# Patient Record
Sex: MALE | Race: WHITE
[De-identification: names, ages, dates, MRNs, and addresses within clinical notes are randomized per-mention and may not be internally consistent; named-entity substitution may affect disease eponyms.]

---

## 2017-11-10 ENCOUNTER — HOSPITAL ENCOUNTER (EMERGENCY)
Dept: HOSPITAL 17 - NEPD | Age: 22
LOS: 1 days | Discharge: HOME | End: 2017-11-11
Payer: COMMERCIAL

## 2017-11-10 VITALS
RESPIRATION RATE: 18 BRPM | SYSTOLIC BLOOD PRESSURE: 133 MMHG | HEART RATE: 98 BPM | OXYGEN SATURATION: 98 % | TEMPERATURE: 98.6 F | DIASTOLIC BLOOD PRESSURE: 76 MMHG

## 2017-11-10 DIAGNOSIS — F91.1: Primary | ICD-10-CM

## 2017-11-10 DIAGNOSIS — F84.0: ICD-10-CM

## 2017-11-10 DIAGNOSIS — Q87.1: ICD-10-CM

## 2017-11-10 DIAGNOSIS — F42.9: ICD-10-CM

## 2017-11-10 LAB
ALP SERPL-CCNC: 93 U/L (ref 45–117)
ALT SERPL-CCNC: 82 U/L (ref 9–52)
ANION GAP SERPL CALC-SCNC: 8 MEQ/L (ref 5–15)
AST SERPL-CCNC: 43 U/L (ref 15–39)
BASOPHILS # BLD AUTO: 0 TH/MM3 (ref 0–0.2)
BASOPHILS NFR BLD: 0.3 % (ref 0–2)
BILIRUB SERPL-MCNC: 0.2 MG/DL (ref 0.2–1)
BUN SERPL-MCNC: 10 MG/DL (ref 7–18)
CHLORIDE SERPL-SCNC: 103 MEQ/L (ref 98–107)
EOSINOPHIL # BLD: 0.3 TH/MM3 (ref 0–0.4)
EOSINOPHIL NFR BLD: 1.8 % (ref 0–4)
ERYTHROCYTE [DISTWIDTH] IN BLOOD BY AUTOMATED COUNT: 13.9 % (ref 11.6–17.2)
GFR SERPLBLD BASED ON 1.73 SQ M-ARVRAT: 137 ML/MIN (ref 89–?)
HCO3 BLD-SCNC: 26 MEQ/L (ref 21–32)
HCT VFR BLD CALC: 39 % (ref 39–51)
HEMO FLAGS: (no result)
LYMPHOCYTES # BLD AUTO: 2.6 TH/MM3 (ref 1–4.8)
LYMPHOCYTES NFR BLD AUTO: 17.8 % (ref 9–44)
MCH RBC QN AUTO: 28 PG (ref 27–34)
MCHC RBC AUTO-ENTMCNC: 33.8 % (ref 32–36)
MCV RBC AUTO: 82.9 FL (ref 80–100)
MONOCYTES NFR BLD: 7.8 % (ref 0–8)
NEUTROPHILS # BLD AUTO: 10.6 TH/MM3 (ref 1.8–7.7)
NEUTROPHILS NFR BLD AUTO: 72.3 % (ref 16–70)
PLATELET # BLD: 337 TH/MM3 (ref 150–450)
POTASSIUM SERPL-SCNC: 3.8 MEQ/L (ref 3.5–5.1)
RBC # BLD AUTO: 4.71 MIL/MM3 (ref 4.5–5.9)
SODIUM SERPL-SCNC: 137 MEQ/L (ref 136–145)
WBC # BLD AUTO: 14.7 TH/MM3 (ref 4–11)

## 2017-11-10 PROCEDURE — 99284 EMERGENCY DEPT VISIT MOD MDM: CPT

## 2017-11-10 PROCEDURE — 80053 COMPREHEN METABOLIC PANEL: CPT

## 2017-11-10 PROCEDURE — 70450 CT HEAD/BRAIN W/O DYE: CPT

## 2017-11-10 PROCEDURE — 85025 COMPLETE CBC W/AUTO DIFF WBC: CPT

## 2017-11-10 NOTE — RADRPT
EXAM DATE/TIME:  11/10/2017 21:43 

 

[facility] COMPARISON:     

No previous studies available for comparison.

 

 

INDICATIONS :     

Altered mental status.

                      

 

RADIATION DOSE:     

32.87 CTDIvol (mGy) 

 

 

 

MEDICAL HISTORY :     

None  

 

SURGICAL HISTORY :      

None. 

 

ENCOUNTER:      

Initial

 

ACUITY:      

1 day

 

PAIN SCALE:      

0/10

 

LOCATION:         

 

TECHNIQUE:     

Multiple contiguous axial images were obtained of the head.  Using automated exposure control and adj
ustment of the mA and/or kV according to patient size, radiation dose was kept as low as reasonably a
chievable to obtain optimal diagnostic quality images.   DICOM format image data is available electro
nically for review and comparison.  

 

FINDINGS:     

 

CEREBRUM:     

The ventricles are normal for age.  No evidence of midline shift, mass lesion, hemorrhage or acute in
farction.  No extra-axial fluid collections are seen.

 

POSTERIOR FOSSA:     

The cerebellum and brainstem are intact.  The 4th ventricle is midline.  The cerebellopontine angle i
s unremarkable.

 

EXTRACRANIAL:     

The visualized portion of the orbits is intact.

 

SKULL:     

The calvaria is intact.  No evidence of skull fracture.

 

CONCLUSION:     

Normal examination.  

 

 

 

 Suraj Morelos Jr., MD on November 10, 2017 at 21:54           

Board Certified Radiologist.

 This report was verified electronically.
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
## 2017-11-11 VITALS
HEART RATE: 76 BPM | OXYGEN SATURATION: 99 % | TEMPERATURE: 97.8 F | SYSTOLIC BLOOD PRESSURE: 132 MMHG | RESPIRATION RATE: 16 BRPM | DIASTOLIC BLOOD PRESSURE: 67 MMHG

## 2017-11-11 VITALS — TEMPERATURE: 97.8 F | SYSTOLIC BLOOD PRESSURE: 128 MMHG | DIASTOLIC BLOOD PRESSURE: 71 MMHG
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
## 2017-11-11 NOTE — PD
Physical Exam


Date Seen by Provider:  Nov 11, 2017


Time Seen by Provider:  08:26


Narrative


22-year-old male patient presents to our facility under protective custody.  

Patient was medically cleared by previous provider.  Patient is currently 

psychiatrically cleared and Portillo act is lifted.  I was asked to disposition 

the patient.





Data


Data


Last Documented VS





Vital Signs








  Date Time  Temp Pulse Resp B/P (MAP) Pulse Ox O2 Delivery O2 Flow Rate FiO2


 


11/10/17 20:46 98.6 98 18 133/76 (95) 98   








Orders





 Orders


Complete Blood Count With Diff (11/10/17 20:38)


Comprehensive Metabolic Panel (11/10/17 20:38)


Psych Screen (11/10/17 20:38)


Drug Screen, Random Urine (11/10/17 20:38)


Ct Brain W/O Iv Contrast(Rout) (11/10/17 )


Diet Regular Basic (11/11/17 Breakfast)





Labs





Laboratory Tests








Test


  11/10/17


20:45


 


White Blood Count 14.7 TH/MM3 


 


Red Blood Count 4.71 MIL/MM3 


 


Hemoglobin 13.2 GM/DL 


 


Hematocrit 39.0 % 


 


Mean Corpuscular Volume 82.9 FL 


 


Mean Corpuscular Hemoglobin 28.0 PG 


 


Mean Corpuscular Hemoglobin


Concent 33.8 % 


 


 


Red Cell Distribution Width 13.9 % 


 


Platelet Count 337 TH/MM3 


 


Mean Platelet Volume 7.5 FL 


 


Neutrophils (%) (Auto) 72.3 % 


 


Lymphocytes (%) (Auto) 17.8 % 


 


Monocytes (%) (Auto) 7.8 % 


 


Eosinophils (%) (Auto) 1.8 % 


 


Basophils (%) (Auto) 0.3 % 


 


Neutrophils # (Auto) 10.6 TH/MM3 


 


Lymphocytes # (Auto) 2.6 TH/MM3 


 


Monocytes # (Auto) 1.2 TH/MM3 


 


Eosinophils # (Auto) 0.3 TH/MM3 


 


Basophils # (Auto) 0.0 TH/MM3 


 


CBC Comment DIFF FINAL 


 


Differential Comment  


 


Blood Urea Nitrogen 10 MG/DL 


 


Creatinine 0.73 MG/DL 


 


Random Glucose 100 MG/DL 


 


Total Protein 8.2 GM/DL 


 


Albumin 3.9 GM/DL 


 


Calcium Level 9.1 MG/DL 


 


Alkaline Phosphatase 93 U/L 


 


Aspartate Amino Transf


(AST/SGOT) 43 U/L 


 


 


Alanine Aminotransferase


(ALT/SGPT) 82 U/L 


 


 


Total Bilirubin 0.2 MG/DL 


 


Sodium Level 137 MEQ/L 


 


Potassium Level 3.8 MEQ/L 


 


Chloride Level 103 MEQ/L 


 


Carbon Dioxide Level 26.0 MEQ/L 


 


Anion Gap 8 MEQ/L 


 


Estimat Glomerular Filtration


Rate 137 ML/MIN 


 











MDM


Supervised Visit with PRAVEEN:  Yes


Differential Diagnosis


Depression versus suicidal ideation versus anxiety versus adjustment disorder 

versus mood disorder versus bipolar disorder versus schizophrenia versus 

paranoid disorder versus psychosis versus substance abuse versus alcohol abuse 

versus alcohol induced psychosis versus homicidality addition versus cutting 

versus personality disorder


Narrative Course


I was asked to disposition this patient after the Portillo act was lifted.  

Patient has been cleared and psychiatrically cleared from our facility.  This 

is a well-nourished 22-year-old male patient currently in no acute distress.  

Patient has a psychiatric history and occasional anger outbursts however he is 

calm, cooperative and ready to go home at this time.  Patient cleared for 

discharge at this time.  Patient will be discharged home with mother.


Diagnosis





 Primary Impression:  


 Aggressive behavior


 Additional Impression:  


 Prader-Willi syndrome


Referrals:  


[facility] (Out patient)


Patient Instructions:  Brief Psychotic Disorder (ED), General Instructions, 

Medical Clearance for Psychiatric Care (ED)





***Additional Instruction:  


Please return to emergency department if your symptoms return or worsen. 


Follow up with your primary care provider. 


May follow-up with [facility] and/or [facility]


Scripts


No Active Prescriptions or Reported Meds


Disposition:  01 DISCHARGE HOME


Condition:  Stable











Deneen Jon Nov 11, 2017 08:30

## 2017-12-01 ENCOUNTER — HOSPITAL ENCOUNTER (EMERGENCY)
Dept: HOSPITAL 17 - NEPD | Age: 22
LOS: 1 days | Discharge: HOME | End: 2017-12-02
Payer: COMMERCIAL

## 2017-12-01 VITALS — WEIGHT: 154.32 LBS | HEIGHT: 64 IN | BODY MASS INDEX: 26.35 KG/M2

## 2017-12-01 VITALS
DIASTOLIC BLOOD PRESSURE: 83 MMHG | SYSTOLIC BLOOD PRESSURE: 127 MMHG | OXYGEN SATURATION: 100 % | TEMPERATURE: 98.1 F | RESPIRATION RATE: 18 BRPM | HEART RATE: 93 BPM

## 2017-12-01 DIAGNOSIS — Q87.1: ICD-10-CM

## 2017-12-01 DIAGNOSIS — F84.0: Primary | ICD-10-CM

## 2017-12-01 DIAGNOSIS — F42.9: ICD-10-CM

## 2017-12-01 PROCEDURE — 99283 EMERGENCY DEPT VISIT LOW MDM: CPT
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
## 2017-12-27 ENCOUNTER — HOSPITAL ENCOUNTER (EMERGENCY)
Dept: HOSPITAL 17 - NEPD | Age: 22
LOS: 1 days | Discharge: HOME | End: 2017-12-28
Payer: COMMERCIAL

## 2017-12-27 VITALS
HEART RATE: 89 BPM | OXYGEN SATURATION: 99 % | RESPIRATION RATE: 18 BRPM | SYSTOLIC BLOOD PRESSURE: 137 MMHG | DIASTOLIC BLOOD PRESSURE: 82 MMHG | TEMPERATURE: 98.7 F

## 2017-12-27 VITALS — WEIGHT: 198.42 LBS | BODY MASS INDEX: 33.06 KG/M2 | HEIGHT: 65 IN
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
## 2017-12-27 DIAGNOSIS — S81.811A: ICD-10-CM

## 2017-12-27 DIAGNOSIS — F84.0: Primary | ICD-10-CM
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
## 2017-12-27 DIAGNOSIS — Z23: ICD-10-CM

## 2017-12-27 PROCEDURE — 90714 TD VACC NO PRESV 7 YRS+ IM: CPT

## 2017-12-27 PROCEDURE — 12002 RPR S/N/AX/GEN/TRNK2.6-7.5CM: CPT

## 2017-12-27 PROCEDURE — 73590 X-RAY EXAM OF LOWER LEG: CPT

## 2017-12-27 PROCEDURE — 90471 IMMUNIZATION ADMIN: CPT
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
## 2017-12-27 NOTE — RADRPT
EXAM DATE/TIME:  12/27/2017 19:27 

 

[facility] COMPARISON:     

No previous studies available for comparison.

 

                     

INDICATIONS :     

Right tibia laceration.

                     

 

MEDICAL HISTORY :     

None.          

 

SURGICAL HISTORY :     

None.   

 

ENCOUNTER:     

Initial                                        

 

ACUITY:     

1 day      

 

PAIN SCORE:     

Non-responsive.

 

LOCATION:     

Right distal tibia.

 

FINDINGS:     

Two view examination of the right tibia demonstrates no evidence of fracture or dislocation. There is
 soft tissue prominence and disruption in the anterior distal calf region. Bony mineralization is nor
mal. No radiopaque foreign bodies.

 

CONCLUSION:     

1. Soft tissue prominence and disruption in the anterior distal calf region without radiopaque foreig
n body or acute fracture.

 

 

 

 Jaime Hannon MD on December 27, 2017 at 19:37           

Board Certified Radiologist.

 This report was verified electronically.

## 2018-01-10 ENCOUNTER — HOSPITAL ENCOUNTER (EMERGENCY)
Dept: HOSPITAL 17 - NED | Age: 23
Discharge: HOME | End: 2018-01-10
Payer: SELF-PAY

## 2018-01-10 VITALS — DIASTOLIC BLOOD PRESSURE: 89 MMHG | SYSTOLIC BLOOD PRESSURE: 140 MMHG | TEMPERATURE: 98 F | OXYGEN SATURATION: 96 %

## 2018-01-10 DIAGNOSIS — Z48.02: Primary | ICD-10-CM

## 2018-01-10 PROCEDURE — 99281 EMR DPT VST MAYX REQ PHY/QHP: CPT

## 2018-01-10 NOTE — PD
HPI


Chief Complaint:  Wound/Suture/Staple Re-Check


Time Seen by Provider:  19:54


Travel History


International Travel<30 days:  No


Contact w/Intl Traveler<30days:  No


Traveled to known affect area:  No





History of Present Illness


HPI


22-year-old white male presents to emergency department accompanied by his 

mother for evaluation of a laceration which was closed staples 14 days ago.  He 

is here for staple removal.  He denies any fever chills.  No drainage.  Mild 

tenderness around the staples.





PFSH


Past Medical History


Diminished Hearing:  No


Psychiatric:  Yes (OCD R/T PWS)


Tetanus Vaccination:  < 5 Years





Social History


Alcohol Use:  No


Tobacco Use:  No


Substance Use:  No





Allergies-Medications


(Allergen,Severity, Reaction):  


Coded Allergies:  


     No Known Allergies (Verified  Allergy, Unknown, 12/27/17)


Reported Meds & Prescriptions





Reported Meds & Active Scripts


Active


No Active Prescriptions or Reported Medications    








Review of Systems


General / Constitutional:  No: Fever


Eyes:  No: Visual changes


HENT:  No: Headaches


Cardiovascular:  No: Chest Pain or Discomfort


Respiratory:  No: Shortness of Breath


Gastrointestinal:  No: Abdominal Pain


Genitourinary:  No: Dysuria


Musculoskeletal:  No: Pain


Skin:  No Rash


Neurologic:  No: Weakness


Psychiatric:  No: Depression


Endocrine:  No: Polydipsia


Hematologic/Lymphatic:  No: Easy Bruising





Physical Exam


Narrative


GENERAL: This is a well-nourished, well-developed patient, in no apparent 

distress.


SKIN: No rashes, ecchymoses or lesions. Warm and dry.


HEAD: Atraumatic. Normocephalic.


EYES: PERRL, EOMI, no discharge or injection. No scleral icterus.


EARS: Clear


NOSE: Nasal turbinates appear normal.


THROAT: Mucosa pink and moist.  Airway patent.


NECK: Trachea midline. supple, moves head freely.


LUNGS: Clear to auscultation.


CV: Regular in rhythm.


ABDOMEN: Soft nontender.


EXT: No clubbing cyanosis or edema.  Patient has a well-healed laceration to 

the right anterior pretibial region.  No signs of infection.  There is mild 

erythema around the staples which I suspect is more rate suture reaction from 

being in 14 days.





Data


Data


Last Documented VS





Vital Signs








  Date Time  Temp Pulse Resp B/P (MAP) Pulse Ox O2 Delivery O2 Flow Rate FiO2


 


1/10/18 20:07        


 


1/10/18 17:00 98.0 111 28  96 Room Air  








Orders





 Orders


Ed Discharge Order (1/10/18 19:58)








MDM


Medical Decision Making


Medical Screen Exam Complete:  Yes


Emergency Medical Condition:  Yes


Medical Record Reviewed:  Yes


Differential Diagnosis


MDM: High


Differential diagnoses: Fracture, sprain, strain, dislocation, contusion, 

neurovascular injury


Narrative Course


Patient Staples are removed without incidence dressing applied.





This is an counter for staple removal





Diagnosis





 Primary Impression:  


 Encounter for staple removal


Patient Instructions:  General Instructions





***Additional Instructions:  


Rest.


Elevation.


Keep clean and dry.


Follow-up with your doctor as needed.


***Med/Other Pt SpecificInfo:  Wound Care


Scripts


No Active Prescriptions or Reported Meds


Disposition:  01 DISCHARGE HOME


Condition:  Stable











Adrian Garner Enrico 10, 2018 19:58

## 2018-03-19 ENCOUNTER — HOSPITAL ENCOUNTER (EMERGENCY)
Dept: HOSPITAL 17 - NEPJ | Age: 23
LOS: 1 days | Discharge: HOME | End: 2018-03-20
Payer: COMMERCIAL

## 2018-03-19 VITALS
RESPIRATION RATE: 20 BRPM | DIASTOLIC BLOOD PRESSURE: 76 MMHG | OXYGEN SATURATION: 97 % | SYSTOLIC BLOOD PRESSURE: 164 MMHG | HEART RATE: 107 BPM

## 2018-03-19 DIAGNOSIS — Q87.1: Primary | ICD-10-CM

## 2018-03-19 DIAGNOSIS — F42.8: ICD-10-CM

## 2018-03-19 DIAGNOSIS — F84.0: ICD-10-CM

## 2018-03-19 PROCEDURE — 99284 EMERGENCY DEPT VISIT MOD MDM: CPT

## 2018-03-19 NOTE — PD
HPI


Chief Complaint:  Psychiatric Symptoms


Time Seen by Provider:  18:28


Travel History


International Travel<30 days:  No


Contact w/Intl Traveler<30days:  No





History of Present Illness


HPI


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 

with increased aggressive behavior towards his mother, with reports of possible 

suicidal ideation.  Patient is a very poor historian due to his mental 

condition.  He has no complaints.  He has no obvious acute injuries.  He has no 

known drug allergies per





[facility]


Past Medical History


Medical History:  Unable to Obtain


Diminished Hearing:  No


Psychiatric:  Yes (OCD R/T PWS)





Social History


Alcohol Use:  No


Tobacco Use:  No


Substance Use:  No





Allergies-Medications


(Allergen,Severity, Reaction):  


Coded Allergies:  


     No Known Allergies (Verified  Allergy, Unknown, 12/27/17)


Reported Meds & Prescriptions





Reported Meds & Active Scripts


Active


No Active Prescriptions or Reported Medications    








Review of Systems


ROS Limitations:  Clinical Condition, Poor Historian


Except as stated in HPI:  all other systems reviewed are Neg


General / Constitutional:  No: Fever


Eyes:  No: Visual changes


HENT:  No: Headaches


Cardiovascular:  No: Chest Pain or Discomfort


Respiratory:  No: Shortness of Breath


Gastrointestinal:  No: Abdominal Pain


Genitourinary:  No: Dysuria


Musculoskeletal:  No: Pain


Skin:  No Rash


Neurologic:  No: Weakness


Psychiatric:  No: Depression


Endocrine:  No: Polydipsia


Hematologic/Lymphatic:  No: Easy Bruising





Physical Exam


Exam Limitations:  Clinical Condition, Poor Historian


Narrative


GENERAL: Patient appears in no obvious distress per


SKIN: Warm and dry.  Normal color.  Normal turgor.  Patient has superficial cut 

to the right anterior knee covered with bandage.  Patient also has superficial 

cut to the right ring finger cover with bandage.


HEAD: Atraumatic. Normocephalic. 


EYES: Pupils equal and round. No scleral icterus. No injection or drainage. 


ENT: No nasal bleeding or discharge.  Mucous membranes pink and moist.  Pharynx 

is clear.  Airways patent


NECK: Trachea midline.  Supple nontender


CARDIOVASCULAR: Regular rate and rhythm.  


RESPIRATORY: No accessory muscle use. Clear to auscultation. Breath sounds 

equal bilaterally. 


MUSCULOSKELETAL: Extremities without clubbing, cyanosis, or edema. No obvious 

deformities. 


NEUROLOGICAL: Awake and alert. No obvious cranial nerve deficits.  Motor 

grossly within normal limits. Five out of 5 muscle strength in the arms and 

legs.  Patient does speak with during my exam does not speak to me when 

questioned.


PSYCHIATRIC: Unable to assess per





Data


Data


Orders





 Orders


Diet Regular Basic (3/19/18 Dinner)


Complete Blood Count With Diff (3/19/18 18:30)


Comprehensive Metabolic Panel (3/19/18 18:30)


Thyroid Stimulating Hormone (3/19/18 18:30)


Psych Screen (3/19/18 18:30)


Urinalysis - C+S If Indicated (3/19/18 18:37)








MDM


Medical Decision Making


Medical Screen Exam Complete:  Yes


Emergency Medical Condition:  Yes


Differential Diagnosis


Baker act.  Aggressive behavior.  Suicidal ideation


Narrative Course


Psychiatric labs were ordered per protocol.


Patient is medically cleared for psychiatric evaluation.


Psych screen is ordered.





Diagnosis





 Primary Impression:  


 Prader-Willi syndrome


Scripts


No Active Prescriptions or Reported Meds


Condition:  Stable











Ray Yu Mar 19, 2018 18:39

## 2018-03-20 VITALS — DIASTOLIC BLOOD PRESSURE: 82 MMHG | RESPIRATION RATE: 22 BRPM | HEART RATE: 120 BPM | SYSTOLIC BLOOD PRESSURE: 160 MMHG

## 2018-03-20 VITALS — RESPIRATION RATE: 18 BRPM

## 2018-03-20 NOTE — PD
Physical Exam


Date Seen by Provider:  Mar 20, 2018


Time Seen by Provider:  11:01


Narrative


22-year-old patient with autistic disorder, previously medically cleared for 

psychiatric evaluation, has been seen by Dr. Thomas and felt to be 

psychiatrically stable for discharge.  Patient's mother has been called and 

spoken to by the psychiatrist.  Follow-up will be based on psychiatric note.  

Patient remains medically stable at this time.





Data


Data


Last Documented VS





Vital Signs








  Date Time  Temp Pulse Resp B/P (MAP) Pulse Ox O2 Delivery O2 Flow Rate FiO2


 


3/20/18 02:32   18     


 


3/19/18 18:57  107   97 Room Air  








Orders





 Orders


Diet Regular Basic (3/19/18 Dinner)


Complete Blood Count With Diff (3/19/18 18:30)


Comprehensive Metabolic Panel (3/19/18 18:30)


Thyroid Stimulating Hormone (3/19/18 18:30)


Psych Screen (3/19/18 18:30)


Urinalysis - C+S If Indicated (3/19/18 18:37)


Diphenhydramine Inj (Benadryl Inj) (3/19/18 22:27)


Olanzapine Inj (Zyprexa Inj) (3/19/18 22:27)


Olanzapine Inj (Zyprexa Inj) (3/19/18 22:20)


Diphenhydramine Inj (Benadryl Inj) (3/19/18 22:20)


Diet Regular Basic (3/20/18 Breakfast)








MDM


Medical Record Reviewed:  Yes


Supervised Visit with PRAVEEN:  Yes


Narrative Course


22-year-old patient with autistic disorder, previously medically cleared for 

psychiatric evaluation, has been seen by Dr. Thomas and felt to be 

psychiatrically stable for discharge.  Patient's mother has been called and 

spoken to by the psychiatrist.  Follow-up will be based on psychiatric note.  

Patient remains medically stable at this time.


Diagnosis





 Primary Impression:  


 Prader-Willi syndrome


Patient Instructions:  General Instructions


Scripts


No Active Prescriptions or Reported Meds


Disposition:  01 DISCHARGE HOME


Condition:  Stable











Ray Yu Mar 20, 2018 11:03

## 2018-03-20 NOTE — PD.PSY.CON
Provisional Diagnosis


Admission Date





Axis I.


Autism spectrum disorder,


Axis II.


Deferred


Axis III.


Prader-Willi's syndrome





History of Present Illness


Service


Psychiatry


Consult Requested By


ER team


Reason for Consult


Under Baker act


Primary Care Physician


Unknown


HPI


The patient is 22-year-old  man, domiciled with his mother in the 

[place], unemployed, single, with psychiatric history of autism spectrum 

disorder, developmental delay, moderate to severe intellectual disability, due 

to Prader Hess syndromes, no previous psychiatric hospitalizations, patient 

is not engaged in outpatient psychiatric care, no medications, with medical 

history of Prader-Willi syndrome, who was brought in under the Baker act with 

increased aggressive behavior towards his mother, with reports of possible 

suicidal ideation.  On psychiatric evaluation the patient is found in the cevallos 

of the ER, he is running, playing, making weird noises, very childish like.  He 

is not agitated, he is not aggressive, persistently smiling.  Patient is a very 

poor historian due to his mental condition/intellectual disability.  Patient 

does not have any psychiatric complaint.  He does not answer to any of my 

questions.  There is no voice of suicidal ideation or homicidal ideation, no 

visual or auditory hallucinations.  I spoke with his mother Carley Connor, [phone], who explains "a couple of things in the kitchen".  She became scared, 

the patient is not usually aggressive, and she preferred to send him to the ER.

  She reports that the patient was in Abilify in the past for poor impulse 

control and aggressive behavior, but he has not been taking the medication for 

a long time now.  I let her know that the patient has been very calm, 

cooperative, childlike in the unit, and she admits that this is his baseline.  

I also explained her that explosive behavior, poor impulse control is part of 

the nature of intellectual disability and can be expected from time to time.  

She verbalized agreement with this idea, as well that she verbalized agreement 

with find an outpatient psychiatric or as soon as possible.





Review of Systems


Constitutional:  DENIES: Diaphoretic episodes, Fatigue, Fever, Weight gain, 

Weight loss, Chills, Dizziness, Change in appetite, Night Sweats


Endocrine:  DENIES: Heat/cold intolerance, Polydipsia, Polyuria, Polyphagia


Eyes:  DENIES: Blurred vision, Diplopia, Eye inflammation, Eye pain, Vision loss

, Photosensitivity, Double Vision


Ears, nose, mouth, throat:  DENIES: Tinnitus, Hearing loss, Vertigo, Nasal 

discharge, Oral lesions, Throat pain, Hoarseness, Ear Pain, Running Nose, 

Epistaxis, Sinus Pain, Toothache, Odynophagia


Respiratory:  DENIES: Apneas, Cough, Snoring, Wheezing, Hemoptysis, Sputum 

production, Shortness of breath


Cardiovascular:  DENIES: Chest pain, Palpitations, Syncope, Dyspnea on Exertion

, PND, Lower Extremity Edema, Orthopnea, Claudication


Gastrointestinal:  DENIES: Abdominal pain, Black stools, Bloody stools, 

Constipation, Diarrhea, Nausea, Vomiting, Difficulty Swallowing, Anorexia


Genitourinary:  DENIES: Sexual dysfunction, Urinary frequency, Urinary 

incontinence, Urgency, Hematuria, Dysuria, Nocturia, Penile Discharge, 

Testicular Pain, Testicular Swelling


Musculoskeletal:  DENIES: Joint pain, Muscle aches, Stiffness, Joint Swelling, 

Back pain, Neck pain


Integumentary:  DENIES: Abnormal pigmentation, Nail changes, Pruritus, Rash


Hematologic/lymphatic:  DENIES: Bruising, Lymphadenopathy


Immunologic/allergic:  DENIES: Eczema, Urticaria


Neurologic:  DENIES: Abnormal gait, Headache, Localized weakness, Paresthesias, 

Seizures, Speech Problems, Tremor, Poor Balance


Psychiatric:  DENIES: Anxiety, Confusion, Mood changes, Depression, 

Hallucinations, Agitation, Suicidal Ideation, Homicidal Ideation, Delusions





Past Family Social History


Coded Allergies:  


     No Known Allergies (Verified  Allergy, Unknown, 12/27/17)


No Active Prescriptions or Reported Meds





Physical Exam


Vital Signs





Vital Signs








  Date Time  Temp Pulse Resp B/P (MAP) Pulse Ox O2 Delivery O2 Flow Rate FiO2


 


3/20/18 11:17  120 22 160/82 (108)  Room Air  


 


3/19/18 18:57     97   














I/O   


 


 3/20/18 3/20/18 3/21/18





 08:00 16:00 00:00


 


Intake Total 360 ml 360 ml 


 


Balance 360 ml 360 ml 











Mental Status Examination


Appearance:  Appropriate


Consciousness:  Alert


Motor Activity:  Abnormal gait


Speech:  Other (Mute mute)


Mood:  Appropriate


Affect:  Appropriate


Insight:  Poor


Judgment:  Poor


Mental Status Exam Remarks


MSE is limited due to the level of intellectual disability





Assessment & Plan


Problem List:  


(1) Autism spectrum disorder


ICD Codes:  F84.0 - Autistic disorder


Assessment & Plan:  Patient does not meet criteria for involuntary psychiatric 

admission at this moment.  He does not present any neuropsychiatric symptoms 

that require an immediate psychiatric intervention.  Aggressive behavior poor 

impulse control, agitation is part of the nature of intellectual disability and 

autism, he has not endorsed any suicidality or homicidality.  On longitudinal 

observation the patient has been very childlike, is mildly very often, with 

prominent mannerisms, but no agitated or aggressive.  His mother was educated 

about the importance of engaging the patient in outpatient psychiatric care for 

management of poor impulse control.  Portillo act will be lifted.





Assessment & Plan


Estimated LOS:  days











Jayy Nguyễn MD Mar 20, 2018 14:21

## 2018-04-28 ENCOUNTER — HOSPITAL ENCOUNTER (EMERGENCY)
Dept: HOSPITAL 17 - NEPD | Age: 23
LOS: 1 days | Discharge: HOME | End: 2018-04-29
Payer: COMMERCIAL

## 2018-04-28 VITALS
HEART RATE: 111 BPM | DIASTOLIC BLOOD PRESSURE: 95 MMHG | TEMPERATURE: 98.5 F | OXYGEN SATURATION: 99 % | SYSTOLIC BLOOD PRESSURE: 143 MMHG | RESPIRATION RATE: 22 BRPM

## 2018-04-28 DIAGNOSIS — Q87.1: ICD-10-CM

## 2018-04-28 DIAGNOSIS — F84.0: Primary | ICD-10-CM

## 2018-04-28 PROCEDURE — 99284 EMERGENCY DEPT VISIT MOD MDM: CPT
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
## 2018-04-29 VITALS
RESPIRATION RATE: 17 BRPM | HEART RATE: 86 BPM | OXYGEN SATURATION: 96 % | SYSTOLIC BLOOD PRESSURE: 142 MMHG | DIASTOLIC BLOOD PRESSURE: 67 MMHG

## 2018-04-29 VITALS — DIASTOLIC BLOOD PRESSURE: 76 MMHG | SYSTOLIC BLOOD PRESSURE: 127 MMHG
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
## 2018-04-29 NOTE — PD
Physical Exam


Date Seen by Provider:  Apr 29, 2018


Time Seen by Provider:  10:37


Narrative


22-year-old male with history of Prader-Willi syndrome presents emergency 

department under [place] act for psychiatric evaluation.  Patient was medically 

cleared, and seen by psychiatric staff who cleared the patient of his [place] 

act.  Patient will be released to his mother's care.  Follow-up will be based 

on psychiatric note.  Patient remains medically stable at this time.





Data


Data


Last Documented VS





Vital Signs








  Date Time  Temp Pulse Resp B/P (MAP) Pulse Ox O2 Delivery O2 Flow Rate FiO2


 


4/29/18 05:53  86 17 142/67 (92) 96 Room Air  


 


4/28/18 22:58 98.5       








Orders





 Orders


Psych Screen (4/28/18 23:16)


Diet Regular Basic (4/29/18 Breakfast)








MDM


Medical Record Reviewed:  Yes


Supervised Visit with PRAVEEN:  Yes


Narrative Course


22-year-old male with history of Prader-Willi syndrome presents emergency 

department under [place] act for psychiatric evaluation.  Patient was medically 

cleared, and seen by psychiatric staff who cleared the patient of his [place] 

act.  Patient will be released to his mother's care.  Follow-up will be based 

on psychiatric note.  Patient remains medically stable at this time.


Diagnosis





 Primary Impression:  


 Autism spectrum disorder


Patient Instructions:  General Instructions


***Med/Other Pt SpecificInfo:  No Change to Meds


Scripts


No Active Prescriptions or Reported Meds


Disposition:  01 DISCHARGE HOME


Condition:  Stable











Ray Yu Apr 29, 2018 10:39